# Patient Record
Sex: FEMALE | URBAN - METROPOLITAN AREA
[De-identification: names, ages, dates, MRNs, and addresses within clinical notes are randomized per-mention and may not be internally consistent; named-entity substitution may affect disease eponyms.]

---

## 2022-02-12 ENCOUNTER — APPOINTMENT (RX ONLY)
Dept: URBAN - METROPOLITAN AREA CLINIC 154 | Facility: CLINIC | Age: 62
Setting detail: DERMATOLOGY
End: 2022-02-12

## 2022-02-12 DIAGNOSIS — Z41.9 ENCOUNTER FOR PROCEDURE FOR PURPOSES OTHER THAN REMEDYING HEALTH STATE, UNSPECIFIED: ICD-10-CM

## 2022-02-12 PROCEDURE — ? BOTOX

## 2022-02-12 NOTE — PROCEDURE: BOTOX
[facility] Units: 0
Show Additional Area 6: Yes
Glabellar Complex Units: 20
Consent: Written consent obtained. Risks include but not limited to lid/brow ptosis, bruising, swelling, diplopia, temporary effect, incomplete chemical denervation.
Show Ucl Units: No
Inferior Lateral Orbicularis Oculi Units: 10
Post-Care Instructions: Patient instructed to not lie down for 4 hours and limit physical activity for 24 hours. Patient instructed not to travel by airplane for 48 hours.
Detail Level: Simple
Lot #: [id number]
Expiration Date (Month Year): 8-23
Dilution (U/0.1 Cc): 4
Additional Area 1 Location: [facility]